# Patient Record
Sex: MALE | Race: BLACK OR AFRICAN AMERICAN | Employment: OTHER | URBAN - METROPOLITAN AREA
[De-identification: names, ages, dates, MRNs, and addresses within clinical notes are randomized per-mention and may not be internally consistent; named-entity substitution may affect disease eponyms.]

---

## 2017-07-05 ENCOUNTER — OFFICE VISIT (OUTPATIENT)
Dept: SURGERY | Age: 72
End: 2017-07-05

## 2017-07-05 VITALS
DIASTOLIC BLOOD PRESSURE: 80 MMHG | HEIGHT: 70 IN | HEART RATE: 77 BPM | TEMPERATURE: 98.7 F | OXYGEN SATURATION: 93 % | WEIGHT: 155 LBS | RESPIRATION RATE: 20 BRPM | BODY MASS INDEX: 22.19 KG/M2 | SYSTOLIC BLOOD PRESSURE: 126 MMHG

## 2017-07-05 DIAGNOSIS — K62.89 RECTAL OR ANAL PAIN: Primary | ICD-10-CM

## 2017-07-05 NOTE — MR AVS SNAPSHOT
Visit Information Date & Time Provider Department Dept. Phone Encounter #  
 7/5/2017  1:00 PM Marielena NunezFracisco [phone] [id number] Follow-up Instructions Return if symptoms worsen or fail to improve. Routing History Upcoming Health Maintenance Date Due Hepatitis C Screening 1945 DTaP/Tdap/Td series (1 - Tdap) 1/23/1968 FOBT Q 1 YEAR AGE 50-75 1/23/1997 ZOSTER VACCINE AGE 60> 1/23/2007 GLAUCOMA SCREENING Q2Y 1/23/2012 Pneumococcal 65+ Low/Medium Risk (1 of 2 - PCV13) 1/23/2012 MEDICARE YEARLY EXAM 1/23/2012 INFLUENZA AGE 9 TO ADULT 8/1/2017 Allergies as of 7/5/2017  Review Complete On: 7/5/2017 By: Marielena Nunez MD  
  
 Severity Noted Reaction Type Reactions Tuberculin Ppd  01/27/2016    Other (comments) Current Immunizations  Never Reviewed No immunizations on file. Not reviewed this visit You Were Diagnosed With   
  
 Codes Comments Rectal or anal pain    -  Primary ICD-10-CM: K62.89 ICD-9-CM: 954.51 Vitals BP Pulse Temp Resp Height(growth percentile) Weight(growth percentile) 126/80 77 98.7 °F (37.1 °C) 20 5' 10\" (1.778 m) 155 lb (70.3 kg) SpO2 BMI Smoking Status 93% 22.24 kg/m2 Former Smoker Vitals History BMI and BSA Data Body Mass Index Body Surface Area  
 22.24 kg/m 2 1.86 m 2 Your Updated Medication List  
  
   
This list is accurate as of: 7/5/17  2:18 PM.  Always use your most recent med list.  
  
  
  
  
 acetaminophen 325 mg tablet Commonly known as:  TYLENOL Take 650 mg by mouth every eight (8) hours as needed. ANUSOL-HC 2.5 % rectal cream  
Generic drug:  hydrocortisone Insert  into rectum as needed. ARTIFICIAL TEARS([id number]) ophthalmic solution Generic drug:  dextran 70-hypromellose Administer 1 Drop to both eyes every four (4) hours as needed. aspirin 81 mg tablet Take 2 Tabs by mouth daily. ATIVAN 0.5 mg tablet Generic drug:  LORazepam  
Take 0.5 mg by mouth two (2) times a day. calcium 500 mg Tab Take  by mouth two (2) times a day. Cholecalciferol (Vitamin D3) 50,000 unit Cap Take  by mouth every thirty (30) days. * DEPAKOTE 500 mg tablet Generic drug:  divalproex DR Take 500 mg by mouth two (2) times a day. * divalproex  mg tablet Commonly known as:  DEPAKOTE Take  by mouth two (2) times a day. DERMASARRA 0.5-0.5 % lotion Generic drug:  camphor-menthol Apply  to affected area as needed. * KEPPRA 1,000 mg tablet Generic drug:  levETIRAcetam  
Take 1,000 mg by mouth two (2) times a day. GIVE WITH 500MG TAB TO TOTAL 1500MG BID. * levETIRAcetam 500 mg tablet Commonly known as:  KEPPRA Take  by mouth two (2) times a day. GIVE WITH 1000MG TAB TO TOTAL 1500 MG BID. lactulose 20 gram packet Commonly known as:  Thomes Pont Take 1 Packet by mouth three (3) times daily. lidocaine 2 % jelly Commonly known as:  XYLOCAINE Apply  to affected area every four (4) hours as needed (RECTAL PAIN). loperamide 2 mg tablet Commonly known as:  IMMODIUM Take 2 mg by mouth three (3) times daily as needed for Diarrhea.  
  
 loratadine 10 mg tablet Commonly known as:  Aliya [place] Take 10 mg by mouth daily as needed. mirtazapine 15 mg tablet Commonly known as:  Unk Lust Take  by mouth nightly. omeprazole 20 mg capsule Commonly known as:  PRILOSEC Take 20 mg by mouth daily. oxyCODONE-acetaminophen 5-325 mg per tablet Commonly known as:  PERCOCET Take 1 Tab by mouth two (2) times daily as needed. Max Daily Amount: 2 Tabs. PATADAY 0.2 % Drop ophthalmic solution Generic drug:  olopatadine Administer 1 Drop to both eyes every four (4) hours as needed. HINOJOSA MILK OF MAGNESIA 400 mg/5 mL suspension Generic drug:  magnesium hydroxide Take 30 mL by mouth every other day. As needed  
  
 polyethylene glycol 17 gram/dose powder Commonly known as:  Oralee Donath Take 17 g by mouth daily. promethazine 12.5 mg tablet Commonly known as:  PHENERGAN Take  by mouth every six (6) hours as needed for Nausea. senna-docusate 8.6-50 mg per tablet Commonly known as:  Lender Motto Take 1 Tab by mouth two (2) times a day. topiramate 50 mg tablet Commonly known as:  TOPAMAX Take  by mouth two (2) times daily as needed (HEADACHE). TRAVATAN Z 0.004 % ophthalmic solution Generic drug:  travoprost  
Administer 1 Drop to both eyes nightly. white petrolatum-mineral oil topical cream  
Commonly known as:  EUCERIN Apply  to affected area two (2) times daily as needed for Dry Skin. * Notice: This list has 4 medication(s) that are the same as other medications prescribed for you. Read the directions carefully, and ask your doctor or other care provider to review them with you. We Performed the Following REFERRAL TO COLON AND RECTAL SURGERY [REF17 Custom] Comments:  
 Please evaluate patient for anal pain, normal rectal exam.  
  
Follow-up Instructions Return if symptoms worsen or fail to improve. Referral Information Referral ID Referred By Referred To  
  
 [id number] [facility]   
   [address] [id number], [address] [place] [address] Visits Status Start Date End Date 1 New Request 7/5/17 7/5/18 If your referral has a status of pending review or denied, additional information will be sent to support the outcome of this decision. Patient Instructions Anal Pain: Care Instructions Your Care Instructions Pain in the opening to the rectum (anus) can be caused by diarrhea or constipation or by scratching a rectal itch. A common cause of anal pain is a tear in the lining of the lower rectum (anal fissure).  This type of anal pain usually goes away when the problem clears up. Injury during anal sex or from an object being placed in the rectum also can cause pain. A rare cause of anal pain is spasms of the muscles in the rectum. Some of these conditions may cause some light bleeding. Home treatment usually can relieve anal pain. If you continue to have anal pain, your doctor may prescribe medicine to relieve pain and other symptoms. Depending on the cause, you may need other treatment. Follow-up care is a key part of your treatment and safety. Be sure to make and go to all appointments, and call your doctor if you are having problems. It's also a good idea to know your test results and keep a list of the medicines you take. How can you care for yourself at home? · Sit in a few inches of warm water (sitz bath) 3 times a day and after bowel movements. The warm water eases discomfort. Do not put soaps, salts, or shampoos in the water. · Drink plenty of fluids, enough so that your urine is light yellow or clear like water. If you have kidney, heart, or liver disease and have to limit fluids, talk with your doctor before you increase the amount of fluids you drink. · Include high-fiber foods, such as fruits, vegetables, beans, and whole grains, in your diet each day. · Take a fiber supplement, such as Benefiber, Citrucel, or Metamucil, every day. Read and follow all instructions on the label. · Use the toilet when you feel the urge. Or when you can, schedule time each day for a bowel movement. A daily routine may help. Take your time and do not strain when having a bowel movement. But do not sit on the toilet too long. · Support your feet with a small step stool when you sit on the toilet. This helps flex your hips and places your pelvis in a squatting position. · Your doctor may recommend an over-the-counter laxative, such as Miralax, Milk of Magnesia, or Ex-Lax.  Read and follow all instructions on the label, and do not use laxatives on a long-term basis. · Do not use over-the-counter ointments or creams without talking to your doctor. Some of these may not help. · Use baby wipes or medicated pads, such as Preparation H or Tucks, instead of toilet paper to clean after a bowel movement. These products do not irritate the anus. · Be safe with medicines. Read and follow all instructions on the label. If the doctor gave you a prescription medicine for pain, take it as prescribed. If you are not taking a prescription pain medicine, ask your doctor if you can take an over-the-counter medicine. When should you call for help? Call 911 anytime you think you may need emergency care. For example, call if: 
· You passed out (lost consciousness). Call your doctor now or seek immediate medical care if: 
· You have a fever. · You have swelling, a lump, a sore, or a new growth in or around your anus. · Your stools are black and tarlike or have streaks of blood. Watch closely for changes in your health, and be sure to contact your doctor if: 
· You do not get better as expected. Where can you learn more? Go to http://cj-woo.info/. Enter 486 [id number] in the search box to learn more about \"Anal Pain: Care Instructions. \" Current as of: August 9, 2016 Content Version: 11.3 © 3566-3138 [employer]. Care instructions adapted under license by [employer] (which disclaims liability or warranty for this information). If you have questions about a medical condition or this instruction, always ask your healthcare professional. [address] any warranty or liability for your use of this information. Introducing [facility]! Andreia James introduces [employer] patient portal. Now you can access parts of your medical record, email your doctor's office, and request medication refills online.    
 
1. In your internet browser, go to [email or web address] 2. Click on the First Time User? Click Here link in the Sign In box. You will see the New Member Sign Up page. 3. Enter your Avaz Access Code exactly as it appears below. You will not need to use this code after youve completed the sign-up process. If you do not sign up before the expiration date, you must request a new code. · Avaz Access Code: [email or web address] Expires: 10/3/2017  2:18 PM 
 
4. Enter the last four digits of your Social Security Number (xxxx) and Date of Birth (mm/dd/yyyy) as indicated and click Submit. You will be taken to the next sign-up page. 5. Create a [employer] ID. This will be your Avaz login ID and cannot be changed, so think of one that is secure and easy to remember. 6. Create a Avaz password. You can change your password at any time. 7. Enter your Password Reset Question and Answer. This can be used at a later time if you forget your password. 8. Enter your e-mail address. You will receive e-mail notification when new information is available in [address] 9. Click Sign Up. You can now view and download portions of your medical record. 10. Click the Download Summary menu link to download a portable copy of your medical information. If you have questions, please visit the Frequently Asked Questions section of the Avaz website. Remember, Avaz is NOT to be used for urgent needs. For medical emergencies, dial 911. Now available from your iPhone and Android! Please provide this summary of care documentation to your next provider. Your primary care clinician is listed as [address] [place] [address]. If you have any questions after today's visit, please call [phone].
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
## 2017-07-05 NOTE — PATIENT INSTRUCTIONS
Anal Pain: Care Instructions  Your Care Instructions  Pain in the opening to the rectum (anus) can be caused by diarrhea or constipation or by scratching a rectal itch. A common cause of anal pain is a tear in the lining of the lower rectum (anal fissure). This type of anal pain usually goes away when the problem clears up. Injury during anal sex or from an object being placed in the rectum also can cause pain. A rare cause of anal pain is spasms of the muscles in the rectum. Some of these conditions may cause some light bleeding. Home treatment usually can relieve anal pain. If you continue to have anal pain, your doctor may prescribe medicine to relieve pain and other symptoms. Depending on the cause, you may need other treatment. Follow-up care is a key part of your treatment and safety. Be sure to make and go to all appointments, and call your doctor if you are having problems. It's also a good idea to know your test results and keep a list of the medicines you take. How can you care for yourself at home? · Sit in a few inches of warm water (sitz bath) 3 times a day and after bowel movements. The warm water eases discomfort. Do not put soaps, salts, or shampoos in the water. · Drink plenty of fluids, enough so that your urine is light yellow or clear like water. If you have kidney, heart, or liver disease and have to limit fluids, talk with your doctor before you increase the amount of fluids you drink. · Include high-fiber foods, such as fruits, vegetables, beans, and whole grains, in your diet each day. · Take a fiber supplement, such as Benefiber, Citrucel, or Metamucil, every day. Read and follow all instructions on the label. · Use the toilet when you feel the urge. Or when you can, schedule time each day for a bowel movement. A daily routine may help. Take your time and do not strain when having a bowel movement. But do not sit on the toilet too long.   · Support your feet with a small step stool when you sit on the toilet. This helps flex your hips and places your pelvis in a squatting position. · Your doctor may recommend an over-the-counter laxative, such as Miralax, Milk of Magnesia, or Ex-Lax. Read and follow all instructions on the label, and do not use laxatives on a long-term basis. · Do not use over-the-counter ointments or creams without talking to your doctor. Some of these may not help. · Use baby wipes or medicated pads, such as Preparation H or Tucks, instead of toilet paper to clean after a bowel movement. These products do not irritate the anus. · Be safe with medicines. Read and follow all instructions on the label. If the doctor gave you a prescription medicine for pain, take it as prescribed. If you are not taking a prescription pain medicine, ask your doctor if you can take an over-the-counter medicine. When should you call for help? Call 911 anytime you think you may need emergency care. For example, call if:  · You passed out (lost consciousness). Call your doctor now or seek immediate medical care if:  · You have a fever. · You have swelling, a lump, a sore, or a new growth in or around your anus. · Your stools are black and tarlike or have streaks of blood. Watch closely for changes in your health, and be sure to contact your doctor if:  · You do not get better as expected. Where can you learn more? Go to http://cj-woo.info/. Enter 636 [id number] in the search box to learn more about \"Anal Pain: Care Instructions. \"  Current as of: August 9, 2016  Content Version: 11.3  © 4544-6657 [employer]. Care instructions adapted under license by [employer] (which disclaims liability or warranty for this information). If you have questions about a medical condition or this instruction, always ask your healthcare professional. [address] any warranty or liability for your use of this information.

## 2017-07-05 NOTE — PROGRESS NOTES
1. Have you been to the ER, urgent care clinic since your last visit? Hospitalized since your last visit?  no    2. Have you seen or consulted any other health care providers outside of the [address] [place] [address] since your last visit? Include any pap smears or colon screening.    [facility] at [facility]